# Patient Record
(demographics unavailable — no encounter records)

---

## 2019-04-12 NOTE — NUR
PATIENT CONTINUES TO APPEAR WELL/DENIES SXS.

 TAUGHT BACK WHAT PROVIDER EXCPLAINED ABOUT RESULTS AND TREATMENT (CONTINUE 
HOME MACROBID/PYRIDIUM)/PUSH FLUIDS AND WATCH FOR WORSENING OF SYMPTOMS

## 2019-04-12 NOTE — NUR
PT AMBULATORY TO ROOM 14 W/ C/O UTI SX STARTED 2 DAYS AGO. PT STATES SHE WAS 
STARTED ON MACROBID YESTERDAY AND STARTED HAVING L FLANK PAIN STARTING LAST NOC 
AND HEMATURIA THIS AM. PT EDUCATED ON HOW TO PROVIDE CLEAN CATCH SAMPLE. PT IN 
RR ATTEMPTING TO PROVIDE UA.